# Patient Record
Sex: MALE | Race: BLACK OR AFRICAN AMERICAN
[De-identification: names, ages, dates, MRNs, and addresses within clinical notes are randomized per-mention and may not be internally consistent; named-entity substitution may affect disease eponyms.]

---

## 2018-05-08 ENCOUNTER — HOSPITAL ENCOUNTER (EMERGENCY)
Dept: HOSPITAL 59 - ER | Age: 38
Discharge: HOME | End: 2018-05-08
Payer: SELF-PAY

## 2018-05-08 DIAGNOSIS — R11.2: ICD-10-CM

## 2018-05-08 DIAGNOSIS — N30.00: Primary | ICD-10-CM

## 2018-05-08 DIAGNOSIS — M54.5: ICD-10-CM

## 2018-05-08 DIAGNOSIS — F17.210: ICD-10-CM

## 2018-05-08 DIAGNOSIS — R51: ICD-10-CM

## 2018-05-08 LAB
APPEARANCE UR: (no result)
BACTERIA #/AREA URNS HPF: (no result) /[HPF]
BILIRUB UR-MCNC: NEGATIVE MG/DL
COLOR UR: YELLOW
EPI CELLS #/AREA URNS HPF: (no result) /[HPF]
GLUCOSE UR STRIP-MCNC: NEGATIVE MG/DL
KETONES UR QL STRIP: NEGATIVE
NITRITE UR QL STRIP: POSITIVE
PROT UR QL STRIP: NEGATIVE
RBC # UR STRIP: NEGATIVE /UL
RBC #/AREA URNS HPF: (no result) /[HPF]
URINE LEUKOCYTE ESTERASE: (no result)
UROBILINOGEN UR STRIP-ACNC: 1 E.U./DL (ref 0.2–1)
WBC #/AREA URNS HPF: (no result) /[HPF]

## 2018-05-08 PROCEDURE — 96372 THER/PROPH/DIAG INJ SC/IM: CPT

## 2018-05-08 PROCEDURE — 81001 URINALYSIS AUTO W/SCOPE: CPT

## 2018-05-08 PROCEDURE — 99283 EMERGENCY DEPT VISIT LOW MDM: CPT

## 2018-05-08 PROCEDURE — 99284 EMERGENCY DEPT VISIT MOD MDM: CPT

## 2018-05-08 NOTE — EMERGENCY DEPARTMENT RECORD
History of Present Illness





- General


Chief Complaint: Back Pain/Injury


Stated Complaint: BACK PAIN


Time Seen by Provider: 18 17:24


Source: Patient


Mode of Arrival: Ambulatory


Limitations: No limitations





- History of Present Illness


Initial Comments: 





38 yo male presents to ED for evaluation of lower back pain symptoms to the 

right lower lumbar region.  Patient denies specific injury, but does reports 

that he awoke with symptoms this morning.  Patient denies fevers, chills, 

urinary retention symptoms, or numbness/weakness to the lower extremities.  

Patient denies history of IVDA.  Patient denies health problems at his baseline.


MD Complaint: Back pain


Onset/Timin


-: Hour(s)


Similar Symptoms Previously: Yes


Radiation: Other


Severity scale (1-10): 8


Consistency: Constant


Improves With: None


Worsens With: None


Context: Unknown


Associated Symptoms: Difficulty urinating, Difficulty walking, Headaches, Nausea

/vomiting


Treatments Prior to Arrival: Acetaminophen





- Related Data


 Previous Rx's











 Medication  Instructions  Recorded


 


Ibuprofen [Motrin] 800 mg PO Q6H PRN #30 tab 18


 


Sulfamethoxazole/Trimethoprim 1 each PO BID #13 tablet 18





[Bactrim Ds Tablet]  











 Allergies











Allergy/AdvReac Type Severity Reaction Status Date / Time


 


No Known Drug Allergies Allergy   Verified 18 17:40














Travel Screening





- Travel/Exposure Within Last 30 Days


Have you traveled within the last 30 days?: No





- Travel/Exposure Within Last Year


Have you traveled outside the [place] in the last year?: No





- Additonal Travel Details


Have you been exposed to anyone with a communicable illness?: No





- Travel Symptoms


Symptom Screening: None





Review of Systems


Constitutional: Denies: Chills, Fever, Malaise, Night sweats


Eyes: Denies: Eye discharge, Eye pain


ENT: Denies: Congestion, Ear pain, Epistaxis


Respiratory: Denies: Cough, Dyspnea


Cardiovascular: Denies: Chest pain, Dyspnea on exertion


Endocrine: Denies: Fatigue, Heat or cold intolerance


Gastrointestinal: Denies: Abdominal pain, Nausea, Vomiting


Genitourinary: Denies: Incontinence, Retention


Musculoskeletal: Reports: Back pain.  Denies: Arthralgia, Gout, Joint swelling


Skin: Denies: Bruising, Change in color


Neurological: Denies: Abnormal gait, Confusion, Headache, Seizure


Psychiatric: Denies: Anxiety


Hematological/Lymphatic: Denies: Anemia, Blood Clots





Past Medical History





- SOCIAL HISTORY


Smoking Status: Current every day smoker


Alcohol Use: None


Drug Use: Heavy


Drug Use Detail:: Marijuana





- RESPIRATORY


Hx Respiratory Disorders: No





- CARDIOVASCULAR


Hx Cardio Disorders: No





- NEURO


Hx Neuro Disorders: No





- GI


Hx GI Disorders: No





- 


Hx Genitourinary Disorders: No





- ENDOCRINE


Hx Endocrine Disorders: No





- MUSCULOSKELETAL


Hx Musculoskeletal Disorders: Yes





- PSYCH


Hx Psych Problems: No





- HEMATOLOGY/ONCOLOGY


Hx Hematology/Oncology Disorders: No





Family Medical History


Any Significant Family History?: No





Physical Exam





- General


General Appearance: Alert, Oriented x3, Cooperative, Mild distress, Other (

Patient noted to be carrying his 1 yo child into the ED on arrival, ambulating 

without distress.)


Limitations: No limitations





- Head


Head exam: Atraumatic, Normocephalic, Normal inspection


Head exam detail: negative: Abrasion, Contusion, Dasilva's sign, General 

tenderness, Hematoma, Laceration





- Eye


Eye exam: Normal appearance.  negative: Conjunctival injection, Periorbital 

swelling, Periorbital tenderness, Scleral icterus





- ENT


Ear exam: negative: Auricular hematoma, Auricular trauma


Nasal Exam: negative: Active bleeding, Discharge, Dried blood, Foreign body


Mouth exam: negative: Drooling, Laceration, Muffled voice, Tongue elevation





- Neck


Neck exam: Normal inspection.  negative: Meningismus, Tenderness





- Respiratory


Respiratory exam: Normal lung sounds bilaterally.  negative: Rales, Respiratory 

distress, Rhonchi, Stridor





- Cardiovascular


Cardiovascular Exam: Regular rate, Normal rhythm, Normal heart sounds





- GI/Abdominal


GI/Abdominal exam: Soft.  negative: Rebound, Rigid, Tenderness





- Rectal


Rectal exam: Deferred





- 


 exam: Deferred





- Extremities


Extremities exam: Normal inspection.  negative: Calf tenderness, Pedal edema, 

Tenderness





- Back


Back exam: Reports: Paraspinal tenderness (Mild TTP over the right 

paravertebral lumbar region).  Denies: CVA tenderness (R), CVA tenderness (L)





- Neurological


Neurological exam: Alert, Normal gait, Oriented X3





- Psychiatric


Psychiatric exam: Normal affect, Normal mood





- Skin


Skin exam: Normal color.  negative: Abrasion


Type of lesion: negative: abrasion





Course





 Vital Signs











  18





  17:26


 


Temperature 99.0 F


 


Pulse Rate 86


 


Respiratory 16





Rate 


 


Blood Pressure 106/75


 


Pulse Ox 96














- Reevaluation(s)


Reevaluation #1: 





18 17:50


Patient has no clinical or historical risk factors for spinal cord compression 

syndrome, and radiographs do not appear indicated given the abscence of 

significant injury and age <50 years.  Will treat for lower myofascial strain 

and reassess.


Reevaluation #2: 





18 18:21


Patient reassessed and reports that his back pain symptoms are greatly improved.


UA reviewed:


No RBCs


16-20 WBCs


Bacteria 4+





Will treat with Bactrim for his symptoms and send GC/Chlamydia.  Patient 

appears stable for discharge at this time.





Disposition


Disposition: Discharge


Clinical Impression: 


UTI (urinary tract infection)


Qualifiers:


 Urinary tract infection type: acute cystitis Hematuria presence: without 

hematuria Qualified Code(s): N30.00 - Acute cystitis without hematuria





Low back pain


Qualifiers:


 Chronicity: acute Back pain laterality: unspecified Sciatica presence: without 

sciatica Qualified Code(s): M54.5 - Low back pain





Disposition: Home, Self-Care


Condition: (2) Stable


Instructions:  Low Back Strain (ED)


Additional Instructions: 


Return to ED if your symptoms worsen or if you have any concerns.


Motrin 800 and Bactrim as directed.


Follow-up with your family doctor in 1-3 days as directed.


Prescriptions: 


Ibuprofen [Motrin] 800 mg PO Q6H PRN #30 tab


 PRN Reason: Pain - Moderate (5-7)


Sulfamethoxazole/Trimethoprim [Bactrim Ds Tablet] 1 each PO BID #13 tablet


Forms:  Patient Portal Access


Time of Disposition: 17:53





Quality





- Quality Measures


Quality Measures: N/A





- Blood Pressure Screening


Does Patient Have Any of the Following: No


Blood Pressure Classification: Normal BP Reading


Systolic Measurement: 113


Diastolic Measurement: 70


Screening for High Blood Pressure: < Normal BP, F/U Not Required > []

## 2018-05-30 ENCOUNTER — HOSPITAL ENCOUNTER (EMERGENCY)
Dept: HOSPITAL 59 - ER | Age: 38
LOS: 1 days | Discharge: ANOTHER HOSPITAL | End: 2018-05-31
Payer: SELF-PAY

## 2018-05-30 DIAGNOSIS — F17.210: ICD-10-CM

## 2018-05-30 DIAGNOSIS — M54.5: ICD-10-CM

## 2018-05-30 DIAGNOSIS — F50.9: Primary | ICD-10-CM

## 2018-05-30 LAB
ALBUMIN SERPL-MCNC: 4.1 G/DL (ref 4–5)
ALBUMIN/GLOB SERPL: 1.4 {RATIO} (ref 1.1–1.8)
ALP SERPL-CCNC: 46 U/L (ref 40–129)
ALT SERPL-CCNC: 19 U/L (ref ?–41)
ANION GAP SERPL CALC-SCNC: 16 MMOL/L (ref 7–16)
APPEARANCE UR: CLEAR
AST SERPL-CCNC: 23 U/L (ref 10–50)
BACTERIA #/AREA URNS HPF: (no result) /[HPF]
BASOPHILS NFR BLD: 0 % (ref 0–6)
BILIRUB SERPL-MCNC: 0.7 MG/DL (ref 0.2–1)
BILIRUB UR-MCNC: NEGATIVE MG/DL
BUN SERPL-MCNC: 11 MG/DL (ref 6–20)
CO2 SERPL-SCNC: 24 MMOL/L (ref 22–29)
COLOR UR: YELLOW
CREAT SERPL-MCNC: 1.3 MG/DL (ref 0.7–1.2)
EOSINOPHIL NFR BLD: 0 % (ref 0–6)
EPI CELLS #/AREA URNS HPF: (no result) /[HPF]
ERYTHROCYTE [DISTWIDTH] IN BLOOD BY AUTOMATED COUNT: 13.9 % (ref 11.5–14.5)
EST GLOMERULAR FILTRATION RATE: > 60 ML/MIN
GLOBULIN SER-MCNC: 2.9 GM/DL (ref 1.4–4.8)
GLUCOSE SERPL-MCNC: 101 MG/DL (ref 74–109)
GLUCOSE UR STRIP-MCNC: NEGATIVE MG/DL
HCT VFR BLD CALC: 43.2 % (ref 42–52)
HGB BLD-MCNC: 14.7 GM/DL (ref 14–18)
KETONES UR QL STRIP: NEGATIVE
LYMPHOCYTES NFR BLD: 8 % (ref 16–45)
MCH RBC QN AUTO: 31.1 PG (ref 27–33)
MCHC RBC AUTO-ENTMCNC: 34 G/DL (ref 32–36)
MCV RBC AUTO: 91.5 FL (ref 81–97)
MONOCYTES NFR BLD: 10 % (ref 0–9)
NEUTROPHILS NFR BLD AUTO: 82 % (ref 47–80)
NEUTS BAND NFR BLD: 0 % (ref 0–5)
NITRITE UR QL STRIP: NEGATIVE
PLATELET # BLD: 248 K/UL (ref 130–400)
PMV BLD AUTO: 10.5 FL (ref 7.4–10.4)
PROT SERPL-MCNC: 7 G/DL (ref 6.6–8.7)
PROT UR QL STRIP: NEGATIVE
RBC # BLD AUTO: 4.72 M/UL (ref 4.4–5.7)
RBC # UR STRIP: NEGATIVE /UL
RBC #/AREA URNS HPF: (no result) /[HPF]
URINE LEUKOCYTE ESTERASE: (no result)
UROBILINOGEN UR STRIP-ACNC: 1 E.U./DL (ref 0.2–1)
WBC # BLD AUTO: 14.3 K/UL (ref 4.2–12.2)

## 2018-05-30 PROCEDURE — 96365 THER/PROPH/DIAG IV INF INIT: CPT

## 2018-05-30 PROCEDURE — 81001 URINALYSIS AUTO W/SCOPE: CPT

## 2018-05-30 PROCEDURE — 96375 TX/PRO/DX INJ NEW DRUG ADDON: CPT

## 2018-05-30 PROCEDURE — 85027 COMPLETE CBC AUTOMATED: CPT

## 2018-05-30 PROCEDURE — 74176 CT ABD & PELVIS W/O CONTRAST: CPT

## 2018-05-30 PROCEDURE — 96361 HYDRATE IV INFUSION ADD-ON: CPT

## 2018-05-30 PROCEDURE — 99285 EMERGENCY DEPT VISIT HI MDM: CPT

## 2018-05-30 PROCEDURE — 80053 COMPREHEN METABOLIC PANEL: CPT

## 2018-05-30 PROCEDURE — 83605 ASSAY OF LACTIC ACID: CPT

## 2018-05-30 NOTE — EMERGENCY DEPARTMENT RECORD
History of Present Illness





- General


Chief Complaint: Back Pain/Injury


Stated Complaint: BACK PAIN


Time Seen by Provider: 18 21:52


Source: Patient


Mode of Arrival: Wheelchair


Limitations: No limitations





- History of Present Illness


Initial Comments: 





39 yo male presents to ED for evaluation of bilateral low back pain symptoms 

that began earlier today.  Patient was seen 3 weeks ago for similar symptoms, 

diagnosed with UTI and treated.  Patient denies health problems at his 

baseline.  Patient does reports taking an OTC medication which did not improve 

his symptoms significantly.


MD Complaint: Back pain


Onset/Timin


-: Hour(s)


Place: Home


Severity: Severe


Quality: Aching


Consistency: Constant


Improves With: None


Worsens With: None


Associated Symptoms: Denies other symptoms


Treatments Prior to Arrival: Other medications





- Related Data


 Previous Rx's











 Medication  Instructions  Recorded


 


Ibuprofen [Motrin] 800 mg PO Q6H PRN #30 tab 18


 


Sulfamethoxazole/Trimethoprim 1 each PO BID #13 tablet 18





[Bactrim Ds Tablet]  











 Allergies











Allergy/AdvReac Type Severity Reaction Status Date / Time


 


No Known Drug Allergies Allergy   Verified 18 17:40














Travel Screening





- Travel/Exposure Within Last 30 Days


Have you traveled within the last 30 days?: No





- Travel/Exposure Within Last Year


Have you traveled outside the [place] in the last year?: No





- Additonal Travel Details


Have you been exposed to anyone with a communicable illness?: No





- Travel Symptoms


Symptom Screening: None





Review of Systems


Constitutional: Reports: Fever.  Denies: Chills, Malaise, Night sweats


Eyes: Denies: Eye discharge, Eye pain


ENT: Denies: Congestion, Ear pain, Epistaxis


Respiratory: Denies: Cough, Dyspnea


Cardiovascular: Denies: Chest pain, Dyspnea on exertion


Endocrine: Denies: Fatigue, Heat or cold intolerance


Gastrointestinal: Denies: Abdominal pain, Nausea, Vomiting


Genitourinary: Denies: Incontinence, Retention


Musculoskeletal: Reports: Back pain.  Denies: Arthralgia, Gout, Joint swelling


Skin: Denies: Bruising, Change in color


Neurological: Denies: Abnormal gait, Confusion, Headache, Seizure


Psychiatric: Denies: Anxiety


Hematological/Lymphatic: Denies: Anemia, Blood Clots





Past Medical History





- SOCIAL HISTORY


Smoking Status: Current every day smoker


Alcohol Use: None


Drug Use: None





- RESPIRATORY


Hx Respiratory Disorders: No





- CARDIOVASCULAR


Hx Cardio Disorders: No





- NEURO


Hx Neuro Disorders: No





- GI


Hx GI Disorders: No





- 


Hx Genitourinary Disorders: No





- ENDOCRINE


Hx Endocrine Disorders: No





- MUSCULOSKELETAL


Hx Musculoskeletal Disorders: Yes





- PSYCH


Hx Psych Problems: No





- HEMATOLOGY/ONCOLOGY


Hx Hematology/Oncology Disorders: No





Family Medical History


Any Significant Family History?: No





Physical Exam





- General


General Appearance: Alert, Oriented x3, Cooperative, Moderate distress


Limitations: No limitations





- Head


Head exam: Atraumatic, Normocephalic, Normal inspection


Head exam detail: negative: Abrasion, Contusion, Dasilva's sign, General 

tenderness, Hematoma, Laceration





- Eye


Eye exam: Normal appearance.  negative: Conjunctival injection, Periorbital 

swelling, Periorbital tenderness, Scleral icterus





- ENT


Ear exam: negative: Auricular hematoma, Auricular trauma


Nasal Exam: negative: Active bleeding, Discharge, Dried blood, Foreign body


Mouth exam: negative: Drooling, Laceration, Muffled voice, Tongue elevation





- Neck


Neck exam: Normal inspection.  negative: Meningismus, Tenderness





- Respiratory


Respiratory exam: Normal lung sounds bilaterally.  negative: Rales, Respiratory 

distress, Rhonchi, Stridor





- Cardiovascular


Cardiovascular Exam: Regular rate, Normal rhythm, Normal heart sounds





- GI/Abdominal


GI/Abdominal exam: Soft.  negative: Rebound, Rigid, Tenderness





- Rectal


Rectal exam: Deferred





- 


 exam: Deferred





- Extremities


Extremities exam: negative: Pedal edema, Tenderness





- Back


Back exam: Reports: CVA tenderness (R), CVA tenderness (L)





- Neurological


Neurological exam: Alert, Normal gait, Oriented X3





- Psychiatric


Psychiatric exam: Normal affect, Normal mood





- Skin


Skin exam: Normal color.  negative: Abrasion


Type of lesion: negative: abrasion





Course





 Vital Signs











  18





  22:01


 


Temperature 100.6 F H


 


Pulse Rate [ 82





Pulse Ox Probe] 


 


Respiratory 18





Rate 


 


Blood Pressure 124/60





[Left Arm] 


 


Pulse Ox 95














- Reevaluation(s)


Reevaluation #1: 





18 22:36


Labs were reviewed, WBC 14.3, 82% neutrophils.


Labs are otherwise grossly unremarkable for an acute process.


Reevaluation #2: 





18 23:04


UA sent to lab, patient reassessed and reports improvement in his symptoms.


Antibiotics initiated at this time.


Reevaluation #3: 





18 23:19


UA reviewed:


RBC 0-2


WBC 3-5


Epi 0-2


Few bacteria





Patient reassessed, reports that his back pain symptoms continue to be 8/10.


Repeat temper is 98.9.


Patient's lower extremity strength 5/5 and symmetric, denies groin numbness or 

urinary retention symptoms.


Patient denies any history of back surgery, IVDA.  Patient denies previous 

cardiac surgery, no heart murmur on examination.


Will initiate transfer for further evaluation.





CT Abdomen/Pelvis:


No acute process





Reevaluation #4: 





18 23:55


Case was discussed with Dr. Fuentes, will accept transfer for further 

evaluation.





Medical Decision Making





- Lab Data


Result diagrams: 


 18 22:06





 18 22:06





Disposition


Disposition: Transfer


Clinical Impression: 


Fever


Qualifiers:


 Fever type: unspecified Qualified Code(s): R50.9 - Fever, unspecified





Back pain


Qualifiers:


 Back pain location: low back pain Chronicity: acute Back pain laterality: 

bilateral Sciatica presence: unspecified whether sciatica present Qualified Code

(s): M54.5 - Low back pain





Disposition: Acute Care Hospital Transfer


Transfer To: [facility]


Reason For Transfer: Urological consultation, SDU


Accepting Physician: Alfredo


Time Discussed w/Accepting Physician: 23:56


Condition: (2) Stable


Forms:  Patient Portal Access


Time of Disposition: 23:56





Quality





- Quality Measures


Quality Measures: N/A





- Blood Pressure Screening


Does Patient Have Any of the Following: No


Blood Pressure Classification: Normal BP Reading


Systolic Measurement: 117


Diastolic Measurement: 66


Screening for High Blood Pressure: < Normal BP, F/U Not Required > []

## 2018-06-01 NOTE — CT SCAN REPORT
EXAM:  CT OF THE ABDOMEN AND PELVIS 



HISTORY:  LOW BACK PAIN.  



TECHNIQUE:  CT of the abdomen and pelvis was performed without oral or IV 
contrast.  This limits evaluation of bowel and solid visceral organs.  



Comparison:  None.  



FINDINGS:  Limited evaluation of the lung bases is unremarkable.  The osseous 
structures are grossly intact.  Limited evaluation of the liver, spleen, 
adrenal glands, pancreas, and kidneys is unremarkable.  The gallbladder is 
present.  Negative for urinary tract calculus or hydronephrosis.  A somewhat 
prominent appearance to the adrenal glands bilaterally may reflect hyperplasia.
  There is a large amount of stool in the colon.  No gross evidence for bowel 
obstruction.  No free air or free fluid.  The appendix is not well seen.  No 
pericecal inflammation.  



IMPRESSION:  

1.  NEGATIVE FOR AN ACUTE INTRAABDOMINAL/PELVIC PROCESS.  



2.  SLIGHTLY PROMINENT APPEARANCE TO THE ADRENAL GLANDS BILATERALLY MAY REFLECT 
HYPERPLASIA.  ABUNDANT STOOL IN THE COLON.  



JOB NUMBER:  [id number]
MTDD

## 2019-09-02 ENCOUNTER — HOSPITAL ENCOUNTER (EMERGENCY)
Dept: HOSPITAL 59 - ER | Age: 39
Discharge: ANOTHER HOSPITAL | End: 2019-09-02
Payer: MEDICAID

## 2019-09-02 DIAGNOSIS — N50.89: Primary | ICD-10-CM

## 2019-09-02 DIAGNOSIS — N50.82: ICD-10-CM

## 2019-09-02 DIAGNOSIS — M54.9: ICD-10-CM

## 2019-09-02 DIAGNOSIS — F17.210: ICD-10-CM

## 2019-09-02 DIAGNOSIS — L02.512: ICD-10-CM

## 2019-09-02 PROCEDURE — 26010 DRAINAGE OF FINGER ABSCESS: CPT

## 2019-09-02 PROCEDURE — 99285 EMERGENCY DEPT VISIT HI MDM: CPT

## 2019-09-02 NOTE — EMERGENCY DEPARTMENT RECORD
History of Present Illness





- General


Chief Complaint: Back Pain/Injury


Stated Complaint: BACK AND TESTICLE PAIN


Time Seen by Provider: 19 13:42


Source: Patient


Mode of Arrival: Ambulatory


Limitations: No limitations





- History of Present Illness


Initial Comments: 





pt is c/o scrotal pain and swelling on the right and having blood come out of 

his penis when he masturbated.    he also c/o back pain and an his finger 

turning yellow


MD Complaint: Back pain, Other (testicular pain and swelling)


Onset/Timin


-: Days(s)


Similar Symptoms Previously: Yes


Place: Home


Radiation: None


Severity: Moderate


Severity scale (1-10): 8


Consistency: Constant


Improves With: Supine


Worsens With: Movement, Walking


Context: Unknown


Associated Symptoms: Other





- Related Data


                                    Allergies











Allergy/AdvReac Type Severity Reaction Status Date / Time


 


No Known Drug Allergies Allergy   Unverified 19 09:40














Travel Screening





- Travel/Exposure Within Last 30 Days


Have you traveled within the last 30 days?: No





Review of Systems


Reviewed: No additional complaints except as noted below


Constitutional: Reports: As per HPI.  Denies: Chills, Fever, Malaise, Night 

sweats, Weakness, Weight change


Eyes: Reports: As per HPI.  Denies: Eye discharge, Eye pain, Photophobia, Vision

change


ENT: Reports: As per HPI.  Denies: Congestion, Dental pain, Ear pain, Epistaxis,

Hearing loss, Throat pain


Respiratory: Reports: As per HPI.  Denies: Cough, Dyspnea, Hemoptysis, Stridor, 

Wheezes


Cardiovascular: Reports: As per HPI.  Denies: Arrhythmia, Chest pain, Dyspnea on

exertion, Edema, Murmurs, Orthopnea, Palpitations, Paroxysmal nocturnal dyspnea,

Rheumatic Fever, Syncope


Endocrine: Reports: As per HPI.  Denies: Fatigue, Heat or cold intolerance, Po

lydipsia, Polyuria


Gastrointestinal: Reports: As per HPI.  Denies: Abdominal pain, Constipation, 

Diarrhea, Hematemesis, Hematochezia, Melena, Nausea, Vomiting


Genitourinary: Reports: As per HPI, Testicular pain.  Denies: Dysuria, 

Frequency, Hematuria, Incontinence, Retention, Testicular mass, Urgency


Musculoskeletal: Reports: As per HPI, Back pain.  Denies: Arthralgia, Gout, 

Joint swelling, Myalgia, Neck pain


Skin: Reports: As per HPI.  Denies: Bruising, Change in color, Change in 

hair/nails, Lesions, Pruritus, Rash


Neurological: Reports: As per HPI.  Denies: Abnormal gait, Confusion, Headache, 

Numbness, Paresthesias, Seizure, Tingling, Tremors, Vertigo, Weakness


Psychiatric: Reports: As per HPI.  Denies: Anxiety, Auditory hallucinations, 

Depression, Homicidal thoughts, Suicidal thoughts, Visual hallucinations


Hematological/Lymphatic: Reports: As per HPI.  Denies: Anemia, Blood Clots, Easy

bleeding, Easy bruising, Swollen glands





Past Medical History





- SOCIAL HISTORY


Smoking Status: Current every day smoker





- RESPIRATORY


Hx Respiratory Disorders: No





- CARDIOVASCULAR


Hx Cardio Disorders: No





- NEURO


Hx Neuro Disorders: No





- GI


Hx GI Disorders: No





- 


Hx Genitourinary Disorders: No





- ENDOCRINE


Hx Endocrine Disorders: No





- MUSCULOSKELETAL


Hx Musculoskeletal Disorders: Yes





- PSYCH


Hx Psych Problems: No





- HEMATOLOGY/ONCOLOGY


Hx Hematology/Oncology Disorders: No





Family Medical History


Any Significant Family History?: No





Physical Exam





- General


General Appearance: Alert, Oriented x3, Cooperative, Mild distress





- Head


Head exam: Normal inspection





- Eye


Eye exam: Normal appearance, PERRL, EOMI


Pupils: Normal accommodation





- ENT


ENT exam: Normal exam, Mucous membranes moist, Normal external ear exam, Normal 

orophraynx


Ear exam: Normal external inspection.  negative: External canal tenderness


Nasal Exam: Normal inspection.  negative: Discharge, Sinus tenderness


Mouth exam: Normal external inspection, Tongue normal


Teeth exam: Normal inspection.  negative: Dental caries


Throat exam: Normal inspection.  negative: Tonsillar erythema, Tonsillar exudate





- Neck


Neck exam: Normal inspection, Full ROM.  negative: Tenderness





- Respiratory


Respiratory exam: Normal lung sounds bilaterally.  negative: Respiratory 

distress





- Cardiovascular


Cardiovascular Exam: Regular rate, Normal rhythm, Normal heart sounds





- GI/Abdominal


GI/Abdominal exam: Soft, Normal bowel sounds.  negative: Tenderness





- Rectal


Rectal exam: Deferred





- 


 exam: Scrotal swelling (on r), Testicular tenderness





- Extremities


Extremities exam: Normal inspection, Full ROM, Normal capillary refill, Other 

(perinichial abscess).  negative: Tenderness





- Back


Back exam: Reports: Normal inspection, Full ROM.  Denies: Muscle spasm, Rash no

sid, Tenderness





- Neurological


Neurological exam: Alert, CN II-XII intact, Normal gait, Oriented X3





- Psychiatric


Psychiatric exam: Normal affect, Normal mood





- Skin


Skin exam: Dry, Intact, Normal color, Warm





Course





                                   Vital Signs











  09/02/19





  13:15


 


Temperature 99.2 F


 


Pulse Rate 83


 


Respiratory 20





Rate 


 


Blood Pressure 113/91


 


Pulse Ox 98














- Reevaluation(s)


Reevaluation #1: 





19 14:13


d/w dr brown


19 14:14


perinichial abscess opened w blade. purulence released





Procedures





- Incision and Drainage


Site: perinichial


Blade Size: 11





Disposition


Disposition: Transfer


Clinical Impression: 


 Testicular swelling, right





Abscess of finger


Qualifiers:


 Laterality: right Qualified Code(s): L02.511 - Cutaneous abscess of right hand





Transfer To: [facility]


Reason For Transfer: needs doppler of testicle


Accepting Physician: dr brown


Time Discussed w/Accepting Physician: 14:14





Quality





- Quality Measures


Quality Measures: N/A





- Blood Pressure Screening


Does Patient Have Any of the Following: No


Blood Pressure Classification: Hypertensive Reading


Systolic Measurement: 113


Diastolic Measurement: 91


Screening for High Blood Pressure: < Pre-Hypertensive BP, F/U Documented > 

[]


Pre-Hypertensive Follow-up Interventions: Follow-up with rescreen every year.